# Patient Record
Sex: FEMALE | Race: BLACK OR AFRICAN AMERICAN | NOT HISPANIC OR LATINO | Employment: UNEMPLOYED | URBAN - METROPOLITAN AREA
[De-identification: names, ages, dates, MRNs, and addresses within clinical notes are randomized per-mention and may not be internally consistent; named-entity substitution may affect disease eponyms.]

---

## 2024-08-16 ENCOUNTER — TELEPHONE (OUTPATIENT)
Dept: RHEUMATOLOGY | Facility: CLINIC | Age: 44
End: 2024-08-16
Payer: MEDICAID

## 2024-08-16 NOTE — TELEPHONE ENCOUNTER
CALLED [facility] SPOKE WITH  MS KRISTEN TO CONFIRM THE FAX NUMBER  EXPLAIN TO MS KRISTEN THE FAX KEPT FALLING  SHE APOLOGIZED . MS KRISTEN WAS GIVEN   INFORMATION THAT RHEUMATOLOGY REFERRAL WAS CANCELLED AND REASONS WHY IT WAS CANCELLED. MS KRISTEN VERBALIZED UNDERSTANDING